# Patient Record
Sex: MALE | Race: WHITE | Employment: STUDENT | URBAN - METROPOLITAN AREA
[De-identification: names, ages, dates, MRNs, and addresses within clinical notes are randomized per-mention and may not be internally consistent; named-entity substitution may affect disease eponyms.]

---
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
## 2022-03-19 PROBLEM — K90.49 POST-FONTAN PROTEIN-LOSING ENTEROPATHY: Status: ACTIVE | Noted: 2019-09-17

## 2022-03-19 PROBLEM — Z98.890 POST-FONTAN PROTEIN-LOSING ENTEROPATHY: Status: ACTIVE | Noted: 2019-09-17

## 2022-03-19 PROBLEM — K91.89 POST-FONTAN PROTEIN-LOSING ENTEROPATHY: Status: ACTIVE | Noted: 2019-09-17

## 2022-03-19 PROBLEM — Z87.74 POST-FONTAN PROTEIN-LOSING ENTEROPATHY: Status: ACTIVE | Noted: 2019-09-17

## 2022-03-19 PROBLEM — K92.2 ACUTE GI BLEEDING: Status: ACTIVE | Noted: 2019-09-17

## 2024-07-11 ENCOUNTER — HOSPITAL ENCOUNTER (OUTPATIENT)
Facility: HOSPITAL | Age: 24
Discharge: HOME | End: 2024-07-11
Payer: OTHER GOVERNMENT

## 2024-07-11 DIAGNOSIS — K76.9 PARENTERAL NUTRITION ASSOCIATED LIVER DISEASE: ICD-10-CM

## 2024-07-11 DIAGNOSIS — T50.905A PARENTERAL NUTRITION ASSOCIATED LIVER DISEASE: ICD-10-CM

## 2024-07-11 PROCEDURE — 76705 ECHO EXAM OF ABDOMEN: CPT

## 2024-09-13 NOTE — TELEPHONE ENCOUNTER
Spoke with mom, stool results are not resulted yet. It usually takes 3 days once the lab receives the sample. Mom acknowledged understanding. Not applicable

## 2025-07-07 ENCOUNTER — TRANSCRIBE ORDERS (OUTPATIENT)
Facility: HOSPITAL | Age: 25
End: 2025-07-07

## 2025-07-07 DIAGNOSIS — Q23.4 HYPOPLASTIC LEFT HEART SYNDROME: Primary | ICD-10-CM

## 2025-07-07 DIAGNOSIS — K91.89 POST-FONTAN PROTEIN-LOSING ENTEROPATHY: ICD-10-CM

## 2025-07-07 DIAGNOSIS — Z87.74 POST-FONTAN PROTEIN-LOSING ENTEROPATHY: ICD-10-CM

## 2025-07-07 DIAGNOSIS — K90.49 POST-FONTAN PROTEIN-LOSING ENTEROPATHY: ICD-10-CM

## 2025-07-21 ENCOUNTER — HOSPITAL ENCOUNTER (OUTPATIENT)
Facility: HOSPITAL | Age: 25
Discharge: HOME | End: 2025-07-24
Payer: OTHER GOVERNMENT

## 2025-07-21 DIAGNOSIS — K90.49 POST-FONTAN PROTEIN-LOSING ENTEROPATHY: ICD-10-CM

## 2025-07-21 DIAGNOSIS — K91.89 POST-FONTAN PROTEIN-LOSING ENTEROPATHY: ICD-10-CM

## 2025-07-21 DIAGNOSIS — Q23.4 HYPOPLASTIC LEFT HEART SYNDROME: ICD-10-CM

## 2025-07-21 DIAGNOSIS — Z87.74 POST-FONTAN PROTEIN-LOSING ENTEROPATHY: ICD-10-CM

## 2025-07-21 PROCEDURE — 76705 ECHO EXAM OF ABDOMEN: CPT

## (undated) DEVICE — DISPOSABLE TOURNIQUET CUFF SINGLE BLADDER, DUAL PORT AND QUICK CONNECT CONNECTOR: Brand: COLOR CUFF

## (undated) DEVICE — DRAPE,EXTREMITY,89X128,STERILE: Brand: MEDLINE

## (undated) DEVICE — CURITY NON-ADHERENT STRIPS: Brand: CURITY

## (undated) DEVICE — ZIMMER® STERILE DISPOSABLE TOURNIQUET CUFF WITH PLC, DUAL PORT, SINGLE BLADDER, 24 IN. (61 CM)

## (undated) DEVICE — SUTURE MCRYL SZ 4 0 L18IN ABSRB UD PC 5 L19MM 3 8 CIR SGL Y823G

## (undated) DEVICE — INFECTION CONTROL KIT SYS

## (undated) DEVICE — DEVON™ KNEE AND BODY STRAP 60" X 3" (1.5 M X 7.6 CM): Brand: DEVON

## (undated) DEVICE — STERILE POLYISOPRENE POWDER-FREE SURGICAL GLOVES: Brand: PROTEXIS

## (undated) DEVICE — (D)PREP SKN CHLRAPRP APPL 26ML -- CONVERT TO ITEM 371833

## (undated) DEVICE — GOWN,SIRUS,NONRNF,SETINSLV,2XL,18/CS: Brand: MEDLINE

## (undated) DEVICE — BANDAGE COMPR 9 FTX4 IN SMOOTH COMFORTABLE SYNTH ESMRK LF

## (undated) DEVICE — SUTURE VCRL SZ 2-0 L27IN ABSRB UD L26MM SH 1/2 CIR J417H

## (undated) DEVICE — SOLUTION IV 1000ML 0.9% SOD CHL

## (undated) DEVICE — SOLUTION IRRIG 1000ML H2O STRL BLT

## (undated) DEVICE — PADDING CST 4INX4YD --

## (undated) DEVICE — (D)STRIP SKN CLSR 0.5X4IN WHT --